# Patient Record
Sex: FEMALE | Race: BLACK OR AFRICAN AMERICAN | NOT HISPANIC OR LATINO | URBAN - METROPOLITAN AREA
[De-identification: names, ages, dates, MRNs, and addresses within clinical notes are randomized per-mention and may not be internally consistent; named-entity substitution may affect disease eponyms.]

---

## 2021-12-25 ENCOUNTER — EMERGENCY (EMERGENCY)
Facility: HOSPITAL | Age: 21
LOS: 1 days | Discharge: HOME | End: 2021-12-25
Attending: EMERGENCY MEDICINE | Admitting: EMERGENCY MEDICINE
Payer: COMMERCIAL

## 2021-12-25 VITALS
DIASTOLIC BLOOD PRESSURE: 74 MMHG | OXYGEN SATURATION: 99 % | HEART RATE: 69 BPM | SYSTOLIC BLOOD PRESSURE: 105 MMHG | TEMPERATURE: 99 F | RESPIRATION RATE: 22 BRPM

## 2021-12-25 VITALS
HEART RATE: 70 BPM | RESPIRATION RATE: 20 BRPM | SYSTOLIC BLOOD PRESSURE: 121 MMHG | OXYGEN SATURATION: 100 % | TEMPERATURE: 98 F | DIASTOLIC BLOOD PRESSURE: 81 MMHG

## 2021-12-25 LAB
ALBUMIN SERPL ELPH-MCNC: 4 G/DL — SIGNIFICANT CHANGE UP (ref 3.3–5)
ALP SERPL-CCNC: 72 U/L — SIGNIFICANT CHANGE UP (ref 40–120)
ALT FLD-CCNC: 12 U/L — SIGNIFICANT CHANGE UP (ref 4–33)
ANION GAP SERPL CALC-SCNC: 11 MMOL/L — SIGNIFICANT CHANGE UP (ref 7–14)
AST SERPL-CCNC: 15 U/L — SIGNIFICANT CHANGE UP (ref 4–32)
BASOPHILS # BLD AUTO: 0.02 K/UL — SIGNIFICANT CHANGE UP (ref 0–0.2)
BASOPHILS NFR BLD AUTO: 0.5 % — SIGNIFICANT CHANGE UP (ref 0–2)
BILIRUB SERPL-MCNC: 0.3 MG/DL — SIGNIFICANT CHANGE UP (ref 0.2–1.2)
BUN SERPL-MCNC: 11 MG/DL — SIGNIFICANT CHANGE UP (ref 7–23)
CALCIUM SERPL-MCNC: 8.7 MG/DL — SIGNIFICANT CHANGE UP (ref 8.4–10.5)
CHLORIDE SERPL-SCNC: 102 MMOL/L — SIGNIFICANT CHANGE UP (ref 98–107)
CO2 SERPL-SCNC: 26 MMOL/L — SIGNIFICANT CHANGE UP (ref 22–31)
CREAT SERPL-MCNC: 0.69 MG/DL — SIGNIFICANT CHANGE UP (ref 0.5–1.3)
D DIMER BLD IA.RAPID-MCNC: 160 NG/ML DDU — SIGNIFICANT CHANGE UP
EOSINOPHIL # BLD AUTO: 0.09 K/UL — SIGNIFICANT CHANGE UP (ref 0–0.5)
EOSINOPHIL NFR BLD AUTO: 2.3 % — SIGNIFICANT CHANGE UP (ref 0–6)
GLUCOSE SERPL-MCNC: 86 MG/DL — SIGNIFICANT CHANGE UP (ref 70–99)
HCG SERPL-ACNC: <5 MIU/ML — SIGNIFICANT CHANGE UP
HCT VFR BLD CALC: 37.7 % — SIGNIFICANT CHANGE UP (ref 34.5–45)
HGB BLD-MCNC: 11.3 G/DL — LOW (ref 11.5–15.5)
IANC: 1.65 K/UL — SIGNIFICANT CHANGE UP (ref 1.5–8.5)
IMM GRANULOCYTES NFR BLD AUTO: 0.3 % — SIGNIFICANT CHANGE UP (ref 0–1.5)
LYMPHOCYTES # BLD AUTO: 1.61 K/UL — SIGNIFICANT CHANGE UP (ref 1–3.3)
LYMPHOCYTES # BLD AUTO: 41.3 % — SIGNIFICANT CHANGE UP (ref 13–44)
MCHC RBC-ENTMCNC: 25.3 PG — LOW (ref 27–34)
MCHC RBC-ENTMCNC: 30 GM/DL — LOW (ref 32–36)
MCV RBC AUTO: 84.3 FL — SIGNIFICANT CHANGE UP (ref 80–100)
MONOCYTES # BLD AUTO: 0.52 K/UL — SIGNIFICANT CHANGE UP (ref 0–0.9)
MONOCYTES NFR BLD AUTO: 13.3 % — SIGNIFICANT CHANGE UP (ref 2–14)
NEUTROPHILS # BLD AUTO: 1.65 K/UL — LOW (ref 1.8–7.4)
NEUTROPHILS NFR BLD AUTO: 42.3 % — LOW (ref 43–77)
NRBC # BLD: 0 /100 WBCS — SIGNIFICANT CHANGE UP
NRBC # FLD: 0 K/UL — SIGNIFICANT CHANGE UP
PLATELET # BLD AUTO: 229 K/UL — SIGNIFICANT CHANGE UP (ref 150–400)
POTASSIUM SERPL-MCNC: 3.7 MMOL/L — SIGNIFICANT CHANGE UP (ref 3.5–5.3)
POTASSIUM SERPL-SCNC: 3.7 MMOL/L — SIGNIFICANT CHANGE UP (ref 3.5–5.3)
PROT SERPL-MCNC: 7.2 G/DL — SIGNIFICANT CHANGE UP (ref 6–8.3)
RBC # BLD: 4.47 M/UL — SIGNIFICANT CHANGE UP (ref 3.8–5.2)
RBC # FLD: 14.9 % — HIGH (ref 10.3–14.5)
SARS-COV-2 RNA SPEC QL NAA+PROBE: DETECTED
SODIUM SERPL-SCNC: 139 MMOL/L — SIGNIFICANT CHANGE UP (ref 135–145)
TROPONIN T, HIGH SENSITIVITY RESULT: <6 NG/L — SIGNIFICANT CHANGE UP
WBC # BLD: 3.9 K/UL — SIGNIFICANT CHANGE UP (ref 3.8–10.5)
WBC # FLD AUTO: 3.9 K/UL — SIGNIFICANT CHANGE UP (ref 3.8–10.5)

## 2021-12-25 PROCEDURE — 99285 EMERGENCY DEPT VISIT HI MDM: CPT | Mod: 25

## 2021-12-25 PROCEDURE — 93010 ELECTROCARDIOGRAM REPORT: CPT

## 2021-12-25 PROCEDURE — 71046 X-RAY EXAM CHEST 2 VIEWS: CPT | Mod: 26

## 2021-12-25 NOTE — ED PROVIDER NOTE - OBJECTIVE STATEMENT
20 y/o female with no significant PMHx presents to the ER c/o 4 days of cough, chest pain with cough, and mild shortness of breath.  Pt denies fevers, chills, nausea, vomiting, recent travel, OCP use, leg swelling.  No significant PMHx.  Pt completed COVID vaccine.  Pt was seen at Urgent Care and sent in the ER for eval. 20 y/o female with no significant PMHx presents to the ER c/o 4 days of cough, chest pain with cough, and mild shortness of breath.  Pt denies fevers, chills, nausea, vomiting, recent travel, OCP use, leg swelling.  No significant PMHx.  Pt completed COVID vaccine.  Pt was seen at Urgent Care and sent in the ER for eval.  Pt had a negative rapid COVID test at urgent care.

## 2021-12-25 NOTE — ED PROVIDER NOTE - PROGRESS NOTE DETAILS
ROBB Sosa: pt feels better ambulating without difficulty.  Results reviewed with patient.  Discharge reviewed and discussed with patient.

## 2021-12-25 NOTE — ED PROVIDER NOTE - NSFOLLOWUPINSTRUCTIONS_ED_ALL_ED_FT
Follow up with your Primary Medical Doctor.  Rest.  Drink plenty of fluids.  Take Tylenol 650mg orally every 6 hours as needed for fever.  Self quarantine as discussed for the next 14 days if COVID positive.  It is very important to be diligent about hand washing & hygiene to avoid spreading the illness to others.  Return to the ER for any persistent/worsening or new symptoms chest pain, shortness of breath, unable to eat/drink high fever or any concerning symptoms.  See attached COVID pamphlet.

## 2021-12-25 NOTE — ED ADULT NURSE NOTE - OBJECTIVE STATEMENT
pt presents with c/o chest pain with cough, sob and runny nose for the past few days, states arrived by ambulance from [facility]

## 2021-12-25 NOTE — ED PROVIDER NOTE - ATTENDING CONTRIBUTION TO CARE
Dr. Peters: 22 yo female with no sig PMH in ED with 4 days of cough, CP, cough and SOB.  Went to urgent care and was sent to ED for concern for possible PE.  Pt denies recent travel, immobility, extremity swelling, hormone use or personal/family history of blood clots.  On exam pt well appearing, in NAD, heart RRR, lungs CTAB, abd NTND, extremities without swelling, strength 5/5 in all extremities and skin without rash.  Of note, EKG from urgent care showing inverted T wave in III but no S1Q3.

## 2021-12-25 NOTE — ED PROVIDER NOTE - CLINICAL SUMMARY MEDICAL DECISION MAKING FREE TEXT BOX
20 y/o female with no significant PMHx presents to the ER c/o 4 days of cough, chest pain with cough, and mild shortness of breath. Pt is well appearing, NAD, lungs CTA, will check labs, CXR, follow up PMD.

## 2021-12-25 NOTE — ED PROVIDER NOTE - PATIENT PORTAL LINK FT
You can access the FollowMyHealth Patient Portal offered by [facility] by registering at the following website: [email or web address]. By joining [employer]’s FollowMyHealth portal, you will also be able to view your health information using other applications (apps) compatible with our system.

## 2024-02-26 ENCOUNTER — EMERGENCY (EMERGENCY)
Facility: HOSPITAL | Age: 24
LOS: 1 days | Discharge: HOME | End: 2024-02-26
Attending: STUDENT IN AN ORGANIZED HEALTH CARE EDUCATION/TRAINING PROGRAM | Admitting: STUDENT IN AN ORGANIZED HEALTH CARE EDUCATION/TRAINING PROGRAM
Payer: COMMERCIAL

## 2024-02-26 VITALS
DIASTOLIC BLOOD PRESSURE: 78 MMHG | HEART RATE: 91 BPM | OXYGEN SATURATION: 98 % | SYSTOLIC BLOOD PRESSURE: 115 MMHG | TEMPERATURE: 99 F | RESPIRATION RATE: 17 BRPM

## 2024-02-26 LAB
B PERT DNA SPEC QL NAA+PROBE: SIGNIFICANT CHANGE UP
B PERT+PARAPERT DNA PNL SPEC NAA+PROBE: SIGNIFICANT CHANGE UP
BORDETELLA PARAPERTUSSIS (RAPRVP): SIGNIFICANT CHANGE UP
C PNEUM DNA SPEC QL NAA+PROBE: SIGNIFICANT CHANGE UP
FLUAV SUBTYP SPEC NAA+PROBE: SIGNIFICANT CHANGE UP
FLUBV RNA SPEC QL NAA+PROBE: SIGNIFICANT CHANGE UP
HADV DNA SPEC QL NAA+PROBE: SIGNIFICANT CHANGE UP
HCOV 229E RNA SPEC QL NAA+PROBE: SIGNIFICANT CHANGE UP
HCOV HKU1 RNA SPEC QL NAA+PROBE: SIGNIFICANT CHANGE UP
HCOV NL63 RNA SPEC QL NAA+PROBE: SIGNIFICANT CHANGE UP
HCOV OC43 RNA SPEC QL NAA+PROBE: SIGNIFICANT CHANGE UP
HMPV RNA SPEC QL NAA+PROBE: SIGNIFICANT CHANGE UP
HPIV1 RNA SPEC QL NAA+PROBE: SIGNIFICANT CHANGE UP
HPIV2 RNA SPEC QL NAA+PROBE: SIGNIFICANT CHANGE UP
HPIV3 RNA SPEC QL NAA+PROBE: DETECTED
HPIV4 RNA SPEC QL NAA+PROBE: SIGNIFICANT CHANGE UP
M PNEUMO DNA SPEC QL NAA+PROBE: SIGNIFICANT CHANGE UP
RAPID RVP RESULT: DETECTED
RSV RNA SPEC QL NAA+PROBE: SIGNIFICANT CHANGE UP
RV+EV RNA SPEC QL NAA+PROBE: SIGNIFICANT CHANGE UP
S PYO DNA THROAT QL NAA+PROBE: SIGNIFICANT CHANGE UP
SARS-COV-2 RNA SPEC QL NAA+PROBE: SIGNIFICANT CHANGE UP

## 2024-02-26 PROCEDURE — 99284 EMERGENCY DEPT VISIT MOD MDM: CPT

## 2024-02-26 PROCEDURE — 71046 X-RAY EXAM CHEST 2 VIEWS: CPT | Mod: 26

## 2024-02-26 RX ORDER — ACETAMINOPHEN 500 MG
650 TABLET ORAL ONCE
Refills: 0 | Status: COMPLETED | OUTPATIENT
Start: 2024-02-26 | End: 2024-02-26

## 2024-02-26 RX ORDER — ACETAMINOPHEN 500 MG
1000 TABLET ORAL ONCE
Refills: 0 | Status: DISCONTINUED | OUTPATIENT
Start: 2024-02-26 | End: 2024-02-26

## 2024-02-26 RX ORDER — SODIUM CHLORIDE 9 MG/ML
1000 INJECTION INTRAMUSCULAR; INTRAVENOUS; SUBCUTANEOUS ONCE
Refills: 0 | Status: DISCONTINUED | OUTPATIENT
Start: 2024-02-26 | End: 2024-02-26

## 2024-02-26 RX ADMIN — Medication 650 MILLIGRAM(S): at 12:36

## 2024-02-26 NOTE — ED ADULT NURSE NOTE - OBJECTIVE STATEMENT
Pt received to intake 3 , awake and alert, A&OX4, ambulatory. C/ sore throat and body aches. pt denies any sick contacts at home.   Respirations even and unlabored. Denies CP, SOB, N/V, HA, dizziness, palpitations, blurry vision. Safety maintained.

## 2024-02-26 NOTE — ED PROVIDER NOTE - PATIENT PORTAL LINK FT
You can access the FollowMyHealth Patient Portal offered by [facility] by registering at the following website: [email or web address]. By joining [employer]’s FollowMyHealth portal, you will also be able to view your health information using other applications (apps) compatible with our system.

## 2024-02-26 NOTE — ED PROVIDER NOTE - NSFOLLOWUPINSTRUCTIONS_ED_ALL_ED_FT
Viral Syndrome    WHAT YOU NEED TO KNOW:    Viral syndrome is a term used for symptoms of an infection caused by a virus. Viruses are spread easily from person to person on shared items.    DISCHARGE INSTRUCTIONS:    Call your local emergency number (911 in the [place]), or have someone call if:    You have a seizure.    You cannot be woken.    You have chest pain or trouble breathing.  Return to the emergency department if:    You have a stiff neck, a bad headache, and sensitivity to light.    You feel weak, dizzy, or confused.    You stop urinating or urinate a lot less than usual.    You cough up blood or thick yellow or green mucus.    You have severe abdominal pain or your abdomen is larger than usual.  Call your doctor if:    Your symptoms do not get better with treatment or get worse after 3 days.    You have a rash or ear pain.    You have burning when you urinate.    You have questions or concerns about your condition or care.  Medicines: You may need any of the following:    Acetaminophen decreases pain and fever. It is available without a doctor's order. Ask how much to take and how often to take it. Follow directions. Read the labels of all other medicines you are using to see if they also contain acetaminophen, or ask your doctor or pharmacist. Acetaminophen can cause liver damage if not taken correctly.    NSAIDs, such as ibuprofen, help decrease swelling, pain, and fever. NSAIDs can cause stomach bleeding or kidney problems in certain people. If you take blood thinner medicine, always ask your healthcare provider if NSAIDs are safe for you. Always read the medicine label and follow directions.    Cold medicine helps decrease swelling, control a cough, and relieve chest or nasal congestion.    Saline nasal spray helps decrease nasal congestion.    Take your medicine as directed. Contact your healthcare provider if you think your medicine is not helping or if you have side effects. Tell your provider if you are allergic to any medicine. Keep a list of the medicines, vitamins, and herbs you take. Include the amounts, and when and why you take them. Bring the list or the pill bottles to follow-up visits. Carry your medicine list with you in case of an emergency.  Manage your symptoms:    Drink liquids as directed to prevent dehydration. Ask how much liquid to drink each day and which liquids are best for you. Do not drink liquids with caffeine. Caffeine can make dehydration worse.    Get plenty of rest to help your body heal. Take naps throughout the day. Ask your healthcare provider when you can return to work and your normal activities.    Use a cool mist humidifier to increase air moisture in your home. This may make it easier for you to breathe and help decrease your cough.    Drink tea with honey or use cough drops for a sore throat. Cough drops are available without a doctor's order. Follow directions for taking cough drops.    Do not smoke or be close to anyone who is smoking. Nicotine and other chemicals in cigarettes and cigars can cause lung damage. Smoking can also delay healing. Ask your healthcare provider for information if you currently smoke and need help to quit. E-cigarettes or smokeless tobacco still contain nicotine. Talk to your healthcare provider before you use these products.  Prevent the spread of germs:    Wash your hands often throughout the day. Use soap and water. Rub your soapy hands together, lacing your fingers, for at least 20 seconds. Rinse with warm, running water. Dry your hands with a clean towel or paper towel. Use hand  that contains alcohol if soap and water are not available. Teach children how to wash their hands and use hand .  Handwashing      Cover sneezes and coughs. Turn your face away and cover your mouth and nose with a tissue. Throw the tissue away. Use the bend of your arm if a tissue is not available. Then wash your hands well with soap and water or use hand . Teach children how to cover a cough or sneeze.    Stay home while you are sick. Avoid crowds as much as possible.    Get the influenza (flu) vaccine as soon as recommended each year. The flu vaccine is available starting in September or October. Ask your healthcare provider about the pneumonia vaccine. This vaccine is usually recommended every 5 years in older adults.    Follow up with your doctor as directed: Write down your questions so you remember to ask them during your visits. Influenza, Adult  Influenza, also called "the flu," is a viral infection that mainly affects the respiratory tract. This includes the lungs, nose, and throat. The flu spreads easily from person to person (is contagious). It causes common cold symptoms, along with high fever and body aches.    What are the causes?  This condition is caused by the influenza virus. You can get the virus by:  Breathing in droplets that are in the air from an infected person's cough or sneeze.  Touching something that has the virus on it (has been contaminated) and then touching your mouth, nose, or eyes.  What increases the risk?  The following factors may make you more likely to get the flu:  Not washing or sanitizing your hands often.  Having close contact with many people during cold and flu season.  Touching your mouth, eyes, or nose without first washing or sanitizing your hands.  Not getting an annual flu shot.  You may have a higher risk for the flu, including serious problems, such as a lung infection (pneumonia), if you:  Are older than 65.  Are pregnant.  Have a weakened disease-fighting system (immune system). This includes people who have HIV or AIDS, are on chemotherapy, or are taking medicines that reduce (suppress) the immune system.  Have a long-term (chronic) illness, such as heart disease, kidney disease, diabetes, or lung disease.  Have a liver disorder.  Are severely overweight (morbidly obese).  Have anemia.  Have asthma.  What are the signs or symptoms?  Symptoms of this condition usually begin suddenly and last 4–14 days. These may include:  Fever and chills.  Headaches, body aches, or muscle aches.  Sore throat.  Cough.  Runny or stuffy (congested) nose.  Chest discomfort.  Poor appetite.  Weakness or fatigue.  Dizziness.  Nausea or vomiting.  How is this diagnosed?  This condition may be diagnosed based on:  Your symptoms and medical history.  A physical exam.  Swabbing your nose or throat and testing the fluid for the influenza virus.  How is this treated?  If the flu is diagnosed early, you can be treated with antiviral medicine that is given by mouth (orally) or through an IV. This can help reduce how severe the illness is and how long it lasts.    Taking care of yourself at home can help relieve symptoms. Your health care provider may recommend:  Taking over-the-counter medicines.  Drinking plenty of fluids.  In many cases, the flu goes away on its own. If you have severe symptoms or complications, you may be treated in a hospital.    Follow these instructions at home:  Activity    Rest as needed and get plenty of sleep.  Stay home from work or school as told by your health care provider. Unless you are visiting your health care provider, avoid leaving home until your fever has been gone for 24 hours without taking medicine.  Eating and drinking    Take an oral rehydration solution (ORS). This is a drink that is sold at pharmacies and retail stores.  Drink enough fluid to keep your urine pale yellow.  Drink clear fluids in small amounts as you are able. Clear fluids include water, ice chips, fruit juice mixed with water, and low-calorie sports drinks.  Eat bland, easy-to-digest foods in small amounts as you are able. These foods include bananas, applesauce, rice, lean meats, toast, and crackers.  Avoid drinking fluids that contain a lot of sugar or caffeine, such as energy drinks, regular sports drinks, and soda.  Avoid alcohol.  Avoid spicy or fatty foods.  General instructions        Take over-the-counter and prescription medicines only as told by your health care provider.  Use a cool mist humidifier to add humidity to the air in your home. This can make it easier to breathe.  When using a cool mist humidifier, clean it daily. Empty the water and replace it with clean water.  Cover your mouth and nose when you cough or sneeze.  Wash your hands with soap and water often and for at least 20 seconds, especially after you cough or sneeze. If soap and water are not available, use alcohol-based hand .  Keep all follow-up visits. This is important.  How is this prevented?    Get an annual flu shot. This is usually available in late summer, fall, or winter. Ask your health care provider when you should get your flu shot.  Avoid contact with people who are sick during cold and flu season. This is generally fall and winter.  Contact a health care provider if:  You develop new symptoms.  You have:  Chest pain.  Diarrhea.  A fever.  Your cough gets worse.  You produce more mucus.  You feel nauseous or you vomit.  Get help right away if you:  Develop shortness of breath or have difficulty breathing.  Have skin or nails that turn a bluish color.  Have severe pain or stiffness in your neck.  Develop a sudden headache or sudden pain in your face or ear.  Cannot eat or drink without vomiting.  These symptoms may represent a serious problem that is an emergency. Do not wait to see if the symptoms will go away. Get medical help right away. Call your local emergency services (911 in the [place] Do not drive yourself to the hospital.    Summary  Influenza, also called "the flu," is a viral infection that primarily affects your respiratory tract.  Symptoms of the flu usually begin suddenly and last 4–14 days.  Getting an annual flu shot is the best way to prevent getting the flu.  Stay home from work or school as told by your health care provider. Unless you are visiting your health care provider, avoid leaving home until your fever has been gone for 24 hours without taking medicine.  Keep all follow-up visits. This is important.  This information is not intended to replace advice given to you by your health care provider. Make sure you discuss any questions you have with your health care provider.

## 2024-02-26 NOTE — ED ADULT TRIAGE NOTE - CHIEF COMPLAINT QUOTE
patient c/o flu-like symptoms since Saturday night. patient endorsing sore throat, nasal congestion that has been worsening, also endorsing generalized body pain. denies past medical history

## 2024-02-26 NOTE — ED PROVIDER NOTE - ATTENDING APP SHARED VISIT CONTRIBUTION OF CARE
I performed a history and physical exam of the patient and discussed their management with the resident/fellow/ACP/student. I have reviewed the resident/fellow/ACP/student note and agree with the documented findings and plan of care, except as noted. I have personally performed a substantive portion of the visit including all aspects of the medical decision making. My medical decision making and observations are found above. Please refer to any progress notes for updates on clinical course.    24-year-old female with no past medical history presenting with viral symptoms. Patient reports symptoms started Saturday night with nasal congestion/body aches/sore throat and intermittent tactile fevers.  Reports coworkers have similar symptoms.  Also reports intermittent shortness of breath with no LOC, chest pain, palpitation, diaphoresis, focal weakness, numbness/tingling. Denies any N/V/D, cough, rashes, dysuria, hematuria, abdominal pain, vaginal bleeding/discharge.    Gen: WDWN, NAD, comfortable appearing, afebrile, hemodynamically stable   HEENT: No nasal discharge, mucous membranes moist, no oropharyngeal edema/erythema/exudates   CV: RRR, +S1/S2, no M/R/G, equal b/l radial pulses 2+  Resp: CTAB, no W/R/R, SPO2 >95% on RA, no increased WOB   GI: Abdomen soft non-distended, NTTP, no masses/organomegaly   MSK/Skin: No CVA tenderness, no open wounds, no bruising, no LE edema  Neuro: A&Ox4, moving all 4 extremities spontaneously  Psych: appropriate mood    MDM:   24-year-old female with no past medical history presenting with viral symptoms, no increased work of breathing/hypoxia, nontoxic/well-appearing, afebrile, hemodynamically stable, + sick contacts, most mucous membranes.  Exam/history concerning for but not limited to viral syndrome versus low suspicion of superimposed bacterial pneumonia.  Will obtain chest x-ray, RVP, and reassess for likely discharge with outpatient follow-up and return precautions

## 2024-02-26 NOTE — ED ADULT TRIAGE NOTE - INTERNATIONAL TRAVEL
C/o heavy vag bleeding x2d w/shaking, vomiting, body aches and sore throat. Admits to cough w/white-yellow sputum. Admits to chills.  
No

## 2024-02-26 NOTE — ED PROVIDER NOTE - CLINICAL SUMMARY MEDICAL DECISION MAKING FREE TEXT BOX
23 yo F no PMH p/w increased sob, nasal congestion, body aches and reported fever. No fever at this time. 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 Took paranol for fever and body aches x2 days ago. Nothing since then. Nontoxic looking. Denies headache, dizziness, chills, chest pain, cough, abdominal pain, sick contact or recent travel. Denies alcohol use or other drugs. Denies recent contact with kissing anyone.   CBC, CMP, RVP with COVID, Chest Xray, Strep  Normal Saline, IV Tylenol  Dispo with follow up 23 yo F no PMH p/w increased sob, nasal congestion, body aches and reported fever. No fever at this time. 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 Took paranol for fever and body aches x2 days ago. Nothing since then. Nontoxic looking. Denies headache, dizziness, chills, chest pain, cough, abdominal pain, sick contact or recent travel. Denies alcohol use or other drugs. Denies recent contact with kissing anyone.   CBC, CMP, RVP with COVID, Chest Xray, Strep  Normal Saline, IV Tylenol  Dispo with follow up    Adrian, Attending  See Attestation